# Patient Record
Sex: MALE | Race: WHITE
[De-identification: names, ages, dates, MRNs, and addresses within clinical notes are randomized per-mention and may not be internally consistent; named-entity substitution may affect disease eponyms.]

---

## 2018-03-05 ENCOUNTER — HOSPITAL ENCOUNTER (EMERGENCY)
Dept: HOSPITAL 62 - ER | Age: 63
Discharge: HOME | End: 2018-03-05
Payer: COMMERCIAL

## 2018-03-05 VITALS — SYSTOLIC BLOOD PRESSURE: 158 MMHG | DIASTOLIC BLOOD PRESSURE: 78 MMHG

## 2018-03-05 DIAGNOSIS — M54.40: Primary | ICD-10-CM

## 2018-03-05 PROCEDURE — 99283 EMERGENCY DEPT VISIT LOW MDM: CPT

## 2018-03-05 NOTE — ER DOCUMENT REPORT
ED Neck/Back Problem





- General


Chief Complaint: Back Pain


Stated Complaint: LOW BACK PAIN


Time Seen by Provider: 03/05/18 21:14


Notes: 


The patient is a 62-year-old male who presents with right low back pain and 

radiation down the back of his right leg that started after he was working 

finishing his new house.  He tried Lidoderm patches without much relief his 

symptoms and took some Motrin this morning.  Patient denies change in bowel or 

bladder, saddle anesthesia, difficulty walking, history of IVDA, fevers or any 

other injuries.


TRAVEL OUTSIDE OF THE [place] IN LAST 30 DAYS: No





Past Medical History





- General


Information source: Patient





- Social History


Smoking Status: Unknown if Ever Smoked


Family History: Reviewed & Not Pertinent


Patient has suicidal ideation: No


Patient has homicidal ideation: No


Renal/ Medical History: Denies: Hx Peritoneal Dialysis





Review of Systems





- Review of Systems


Notes: 


REVIEW OF SYSTEMS:


CONSTITUTIONAL: -fevers, -chills


EENT: -eye pain, -difficulty swallowing, -nasal congestion


CARDIOVASCULAR: -chest pain, -syncope.


RESPIRATORY: -cough, -SOB


GASTROINTESTINAL: -abdominal pain, -nausea, -vomiting, -diarrhea


GENITOURINARY: -dysuria, -hematuria


MUSCULOSKELETAL: +back pain, -neck pain


SKIN: -rash or skin lesions.


HEMATOLOGIC: -easy bruising or bleeding.


LYMPHATIC: -swollen, enlarged glands.


NEUROLOGICAL: -altered mental status or loss of consciousness, -headache, +

tingling down back of right leg


PSYCHIATRIC: -anxiety, -depression.


ALL OTHER SYSTEMS REVIEWED AND NEGATIVE.





Physical Exam





- Vital signs


Vitals: 


 











Temp Pulse Resp BP Pulse Ox


 


 97.7 F   76   18   158/78 H  97 


 


 03/05/18 20:23  03/05/18 20:23  03/05/18 20:23  03/05/18 20:23  03/05/18 20:23














- Notes


Notes: 


PHYSICAL EXAMINATION:


GENERAL: Well-appearing, well-nourished and in no acute distress.


HEAD: Atraumatic, normocephalic.


EYES: Pupils equal round and reactive to light, extraocular movements intact, 

sclera anicteric, conjunctiva are normal.


ENT: nares patent, oropharynx clear without exudates.  Moist mucous membranes.


NECK: Normal range of motion, supple without lymphadenopathy


LUNGS: Breath sounds clear to auscultation bilaterally and equal.  No wheezes 

rales or rhonchi.


HEART: Regular rate and rhythm without murmurs


ABDOMEN: Soft, nontender, normoactive bowel sounds.  No guarding, no rebound.  

No masses appreciated.


EXTREMITIES: Normal range of motion, no pitting or edema.  No cyanosis. Strong 

distal pulses. No calf tenderness or swelling.


BACK: Tenderness over right SI joint. No midline tenderness.


NEUROLOGICAL: Cranial nerves grossly intact.  Normal speech, normal gait.  

Normal sensory and motor exams.


PSYCH: Normal mood, normal affect.


SKIN: Warm, Dry, normal turgor, no rashes or lesions noted.





Course





- Re-evaluation


Re-evalutation: 


Patient with acute right lower back pain and symptoms of sciatica.  No red flag 

signs for low back pain at this time.  He is tender over the right SI joint, 

but no midline tenderness.  Instructed him about anti-inflammatories, using 

Lidoderm patches and muscle relaxers as needed with referral to physical 

therapy.  Given strict return precautions and he understands.





- Vital Signs


Vital signs: 


 











Temp Pulse Resp BP Pulse Ox


 


 97.7 F   76   18   158/78 H  97 


 


 03/05/18 20:23  03/05/18 20:23  03/05/18 20:23  03/05/18 20:23  03/05/18 20:23














Discharge





- Discharge


Clinical Impression: 


 Back pain of lumbosacaral region with sciatica





Condition: Stable


Disposition: HOME, SELF-CARE


Additional Instructions: 


LOW BACK PAIN:





     Three out of every four people will have an episode of disabling back pain 

during their lifetime.  Most commonly the pain is due to straining of the 

muscles and ligaments in the low back.


     Usual treatment includes: 


(1) Rest on a firm surface.  Avoid lying on your stomach.  


(2) Ice pack the painful area.  After a few days, gentle heat may be used 

intermittently to relax the area, or ice packs can be continued.  


(3) Medication may be needed -- muscle relaxers and antiinflammatory medicines 

are commonly used.  


(4) As the back improves, exercises are prescribed to strengthen the back and 

abdominal muscles.


     Your doctor will advise you on the proper care for your back at each stage 

in your recovery.  You may be better in a few days -- or healing may take 

several weeks.


     If new symptoms of a "herniated disc" (radiation of pain, numbness, or 

tingling down the back of the leg or weakness in the leg) occur, you should be 

re-examined.  Further testing may be necessary.





MUSCLE RELAXERS: 


     Muscle relaxing medications are usually prescribed for acute muscle spasm 

or injury to the neck and back.  They are often combined with antiinflammatory 

pain medication for increased relief.


     You may stop the muscle relaxer when the pain and stiffness have improved.

  Start the medication again if spasms recur.  


     Muscle relaxers may cause drowsiness, especially with the first dose.  Do 

not operate machinery or drive while under the effects of the medication.  Most 

muscle relaxers last up to 24 hours.  Do not combine the medication with 

alcohol.








ICE PACKS:


     Apply ice packs frequently against the painful area.  Many different 

schedules are recommended, such as "20 minutes on, 20 minutes off" or "one hour 

ice, two hours rest."  If you need to work, you may need to go longer between 

ice treatments.  You should plan to have the area ice packed AT LEAST one 

fourth of the time.


     The ice should be applied over the wrap, tape, or splint, or over a layer 

of cloth -- not directly against the skin.  Some ice bags have a built-in cloth 

and can be put directly on the skin.





WARM PACKS:


     After approximately two days, apply gentle heat (such as a heating pad or 

hot water bottle) for about 20 to 30 minutes about every two hours -- at least 

four times daily.  Warmth and elevation will help you make a more rapid recovery

, and will ease the pain considerably.


     Do not use HOT heat, and never apply heat for longer than 30 minutes.  The 

continuous heat can invisibly damage skin and muscles -- even when no burn is 

seen on the surface.  Damaged muscles can make you MORE sore.








FOLLOW-UP CARE:


If you have been referred to a physician for follow-up care, call the physician

s office for an appointment as you were instructed or within the next two days.

  If you experience worsening or a significant change in your symptoms, notify 

the physician immediately or return to the Emergency Department at any time for 

re-evaluation.


Prescriptions: 


Methocarbamol [Robaxin 500 mg Tablet] 500 mg PO Q4H PRN #15 tablet


 PRN Reason: 


Forms:  Elevated Blood Pressure


Referrals: 


[facility] [Outside] - Follow up as needed